# Patient Record
Sex: MALE | URBAN - METROPOLITAN AREA
[De-identification: names, ages, dates, MRNs, and addresses within clinical notes are randomized per-mention and may not be internally consistent; named-entity substitution may affect disease eponyms.]

---

## 2022-01-04 ENCOUNTER — TELEPHONE ENCOUNTER (OUTPATIENT)
Dept: URBAN - METROPOLITAN AREA CLINIC 96 | Facility: CLINIC | Age: 77
End: 2022-01-04

## 2022-01-06 ENCOUNTER — WEB ENCOUNTER (OUTPATIENT)
Dept: URBAN - METROPOLITAN AREA CLINIC 96 | Facility: CLINIC | Age: 77
End: 2022-01-06

## 2022-01-06 ENCOUNTER — TELEPHONE ENCOUNTER (OUTPATIENT)
Dept: URBAN - METROPOLITAN AREA CLINIC 92 | Facility: CLINIC | Age: 77
End: 2022-01-06

## 2022-01-06 ENCOUNTER — OFFICE VISIT (OUTPATIENT)
Dept: URBAN - METROPOLITAN AREA TELEHEALTH 2 | Facility: TELEHEALTH | Age: 77
End: 2022-01-06
Payer: MEDICARE

## 2022-01-06 DIAGNOSIS — R19.7 DIARRHEA, UNSPECIFIED: ICD-10-CM

## 2022-01-06 DIAGNOSIS — K90.9 INTESTINAL MALABSORPTION, UNSPECIFIED: ICD-10-CM

## 2022-01-06 DIAGNOSIS — R10.84 GENERALIZED ABDOMINAL PAIN: ICD-10-CM

## 2022-01-06 PROCEDURE — 99205 OFFICE O/P NEW HI 60 MIN: CPT | Performed by: INTERNAL MEDICINE

## 2022-01-06 RX ORDER — COLESEVELAM HYDROCHLORIDE 625 MG/1
3 TABLETS WITH MEALS TABLET, FILM COATED ORAL TWICE A DAY
Qty: 180 TABLET | Refills: 6 | OUTPATIENT
Start: 2022-01-06

## 2022-01-06 RX ORDER — PITAVASTATIN CALCIUM 2.09 MG/1
1 TABLET TABLET, FILM COATED ORAL ONCE A DAY
Status: ACTIVE | COMMUNITY

## 2022-01-06 NOTE — HPI-TODAY'S VISIT:
Pt at HOME . Since 2010 he has had a lot of GI issues.  He has positive SIBO test (high H2) and was given a course of Xifaxan for this. . Today on 1/6/2022, pt reports that he has loose BM daily (anywhere from 1-3 BM per day); however, he has urgency sxs but may not have a BM.  He has abd cramps on a frequent basis.  He has severe cramps and bloating sxs, worse in the AM.    He has reflux sxs for which he will awaken at night on a frequent basis that awakens him at night.  He has lot of weight fluctuations.  He has a h/o FMT in 2018. Ever since starting Creon, he has seen improvement in his energy state (but not in diarrhea or abd pain). . CT ABD (see scan): 2019: normal

## 2022-03-25 ENCOUNTER — OFFICE VISIT (OUTPATIENT)
Dept: URBAN - METROPOLITAN AREA TELEHEALTH 2 | Facility: TELEHEALTH | Age: 77
End: 2022-03-25
Payer: MEDICARE

## 2022-03-25 ENCOUNTER — DASHBOARD ENCOUNTERS (OUTPATIENT)
Age: 77
End: 2022-03-25

## 2022-03-25 DIAGNOSIS — R19.7 DIARRHEA, UNSPECIFIED: ICD-10-CM

## 2022-03-25 DIAGNOSIS — R10.84 GENERALIZED ABDOMINAL PAIN: ICD-10-CM

## 2022-03-25 DIAGNOSIS — K90.89 BILE ACID MALABSORPTION SYNDROME: ICD-10-CM

## 2022-03-25 PROBLEM — 62315008: Status: ACTIVE | Noted: 2022-01-06

## 2022-03-25 PROCEDURE — 99203 OFFICE O/P NEW LOW 30 MIN: CPT | Performed by: INTERNAL MEDICINE

## 2022-03-25 RX ORDER — COLESEVELAM HYDROCHLORIDE 625 MG/1
3 TABLETS WITH MEALS TABLET, FILM COATED ORAL TWICE A DAY
Qty: 180 TABLET | Refills: 11 | OUTPATIENT

## 2022-03-25 RX ORDER — PITAVASTATIN CALCIUM 2.09 MG/1
1 TABLET TABLET, FILM COATED ORAL ONCE A DAY
Status: ACTIVE | COMMUNITY

## 2022-03-25 RX ORDER — COLESEVELAM HYDROCHLORIDE 625 MG/1
3 TABLETS WITH MEALS TABLET, FILM COATED ORAL TWICE A DAY
Qty: 180 TABLET | Refills: 6 | Status: ACTIVE | COMMUNITY
Start: 2022-01-06

## 2022-03-25 NOTE — HPI-TODAY'S VISIT:
. Since 2010 he has had a lot of GI issues.  He has positive SIBO test (high H2) and was given a course of Xifaxan for this. . Previously on 1/6/2022, pt reports that he has loose BM daily (anywhere from 1-3 BM per day); however, he has urgency sxs but may not have a BM.  He has abd cramps on a frequent basis.  He has severe cramps and bloating sxs, worse in the AM.    He has reflux sxs for which he will awaken at night on a frequent basis that awakens him at night.  He has lot of weight fluctuations.  He has a h/o FMT in 2018. Ever since starting Creon, he has seen improvement in his energy state (but not in diarrhea or abd pain). . Today on 3/25/2022, pt reports that he saw PCP and was asked about his aspirin intake and acid reflux medication, omeprazole.  He started to take Mevalor.  He has stopped the omeprazole and sxs improved.  Some days he has some abd issues and continues to have soft/pudding like stools on some occasions, but so much better. . CT ABD (see scan): 2019: normal